# Patient Record
Sex: MALE | Race: BLACK OR AFRICAN AMERICAN | Employment: FULL TIME | URBAN - METROPOLITAN AREA
[De-identification: names, ages, dates, MRNs, and addresses within clinical notes are randomized per-mention and may not be internally consistent; named-entity substitution may affect disease eponyms.]

---

## 2017-11-07 ENCOUNTER — HOSPITAL ENCOUNTER (OUTPATIENT)
Age: 52
Setting detail: OBSERVATION
Discharge: HOME | End: 2017-11-08
Attending: EMERGENCY MEDICINE | Admitting: HOSPITALIST
Payer: COMMERCIAL

## 2017-11-07 ENCOUNTER — APPOINTMENT (OUTPATIENT)
Dept: GENERAL RADIOLOGY | Age: 52
End: 2017-11-07
Attending: NURSE PRACTITIONER
Payer: COMMERCIAL

## 2017-11-07 DIAGNOSIS — E86.0 DEHYDRATION: ICD-10-CM

## 2017-11-07 DIAGNOSIS — R42 DIZZINESS: ICD-10-CM

## 2017-11-07 DIAGNOSIS — R73.9 HYPERGLYCEMIA: Primary | ICD-10-CM

## 2017-11-07 PROBLEM — E11.00 TYPE 2 DIABETES MELLITUS WITH HYPEROSMOLARITY WITHOUT NONKETOTIC HYPERGLYCEMIC-HYPEROSMOLAR COMA (NKHHC) (HCC): Status: ACTIVE | Noted: 2017-11-07

## 2017-11-07 PROBLEM — N17.9 AKI (ACUTE KIDNEY INJURY) (HCC): Status: ACTIVE | Noted: 2017-11-07

## 2017-11-07 LAB
ADMINISTERED INITIALS, ADMINIT: NORMAL
ALBUMIN SERPL-MCNC: 3 G/DL (ref 3.4–5)
ALBUMIN SERPL-MCNC: 3.2 G/DL (ref 3.4–5)
ALBUMIN SERPL-MCNC: 3.3 G/DL (ref 3.4–5)
ALBUMIN/GLOB SERPL: 0.8 {RATIO} (ref 0.8–1.7)
ALBUMIN/GLOB SERPL: 0.9 {RATIO} (ref 0.8–1.7)
ALBUMIN/GLOB SERPL: 0.9 {RATIO} (ref 0.8–1.7)
ALP SERPL-CCNC: 123 U/L (ref 45–117)
ALP SERPL-CCNC: 84 U/L (ref 45–117)
ALP SERPL-CCNC: 99 U/L (ref 45–117)
ALT SERPL-CCNC: 37 U/L (ref 16–61)
ALT SERPL-CCNC: 37 U/L (ref 16–61)
ALT SERPL-CCNC: 40 U/L (ref 16–61)
ANION GAP SERPL CALC-SCNC: 10 MMOL/L (ref 3–18)
ANION GAP SERPL CALC-SCNC: 6 MMOL/L (ref 3–18)
ANION GAP SERPL CALC-SCNC: 9 MMOL/L (ref 3–18)
APPEARANCE UR: CLEAR
ARTERIAL PATENCY WRIST A: YES
AST SERPL-CCNC: 19 U/L (ref 15–37)
AST SERPL-CCNC: 19 U/L (ref 15–37)
AST SERPL-CCNC: 20 U/L (ref 15–37)
ATRIAL RATE: 72 BPM
BASE EXCESS BLD CALC-SCNC: 0 MMOL/L
BASOPHILS # BLD: 0 K/UL (ref 0–0.06)
BASOPHILS NFR BLD: 0 % (ref 0–2)
BDY SITE: ABNORMAL
BILIRUB SERPL-MCNC: 0.4 MG/DL (ref 0.2–1)
BILIRUB SERPL-MCNC: 0.4 MG/DL (ref 0.2–1)
BILIRUB SERPL-MCNC: 0.5 MG/DL (ref 0.2–1)
BILIRUB UR QL: NEGATIVE
BUN SERPL-MCNC: 30 MG/DL (ref 7–18)
BUN SERPL-MCNC: 30 MG/DL (ref 7–18)
BUN SERPL-MCNC: 32 MG/DL (ref 7–18)
BUN/CREAT SERPL: 19 (ref 12–20)
BUN/CREAT SERPL: 20 (ref 12–20)
BUN/CREAT SERPL: 23 (ref 12–20)
CALCIUM SERPL-MCNC: 8.8 MG/DL (ref 8.5–10.1)
CALCIUM SERPL-MCNC: 8.9 MG/DL (ref 8.5–10.1)
CALCIUM SERPL-MCNC: 9.1 MG/DL (ref 8.5–10.1)
CALCULATED P AXIS, ECG09: 43 DEGREES
CALCULATED R AXIS, ECG10: 6 DEGREES
CALCULATED T AXIS, ECG11: 36 DEGREES
CHLORIDE SERPL-SCNC: 107 MMOL/L (ref 100–108)
CHLORIDE SERPL-SCNC: 109 MMOL/L (ref 100–108)
CHLORIDE SERPL-SCNC: 96 MMOL/L (ref 100–108)
CK MB CFR SERPL CALC: 1.4 % (ref 0–4)
CK MB SERPL-MCNC: 4 NG/ML (ref 5–25)
CK SERPL-CCNC: 295 U/L (ref 39–308)
CO2 SERPL-SCNC: 30 MMOL/L (ref 21–32)
CO2 SERPL-SCNC: 31 MMOL/L (ref 21–32)
CO2 SERPL-SCNC: 33 MMOL/L (ref 21–32)
COLOR UR: YELLOW
CREAT SERPL-MCNC: 1.32 MG/DL (ref 0.6–1.3)
CREAT SERPL-MCNC: 1.5 MG/DL (ref 0.6–1.3)
CREAT SERPL-MCNC: 1.67 MG/DL (ref 0.6–1.3)
D50 ADMINISTERED, D50ADM: 0 ML
D50 ORDER, D50ORD: 0 ML
DIAGNOSIS, 93000: NORMAL
DIFFERENTIAL METHOD BLD: ABNORMAL
EOSINOPHIL # BLD: 0 K/UL (ref 0–0.4)
EOSINOPHIL NFR BLD: 0 % (ref 0–5)
ERYTHROCYTE [DISTWIDTH] IN BLOOD BY AUTOMATED COUNT: 13.8 % (ref 11.6–14.5)
EST. AVERAGE GLUCOSE BLD GHB EST-MCNC: 278 MG/DL
GAS FLOW.O2 O2 DELIVERY SYS: ABNORMAL L/MIN
GLOBULIN SER CALC-MCNC: 3.6 G/DL (ref 2–4)
GLOBULIN SER CALC-MCNC: 3.6 G/DL (ref 2–4)
GLOBULIN SER CALC-MCNC: 3.7 G/DL (ref 2–4)
GLUCOSE BLD STRIP.AUTO-MCNC: 166 MG/DL (ref 70–110)
GLUCOSE BLD STRIP.AUTO-MCNC: 177 MG/DL (ref 70–110)
GLUCOSE BLD STRIP.AUTO-MCNC: 203 MG/DL (ref 70–110)
GLUCOSE BLD STRIP.AUTO-MCNC: 225 MG/DL (ref 70–110)
GLUCOSE BLD STRIP.AUTO-MCNC: 260 MG/DL (ref 70–110)
GLUCOSE BLD STRIP.AUTO-MCNC: 289 MG/DL (ref 70–110)
GLUCOSE BLD STRIP.AUTO-MCNC: 423 MG/DL (ref 70–110)
GLUCOSE BLD STRIP.AUTO-MCNC: 441 MG/DL (ref 70–110)
GLUCOSE BLD STRIP.AUTO-MCNC: >600 MG/DL (ref 70–110)
GLUCOSE BLD STRIP.AUTO-MCNC: >600 MG/DL (ref 70–110)
GLUCOSE SERPL-MCNC: 184 MG/DL (ref 74–99)
GLUCOSE SERPL-MCNC: 370 MG/DL (ref 74–99)
GLUCOSE SERPL-MCNC: 798 MG/DL (ref 74–99)
GLUCOSE UR STRIP.AUTO-MCNC: >1000 MG/DL
GLUCOSE, GLC: 166 MG/DL
GLUCOSE, GLC: 203 MG/DL
GLUCOSE, GLC: 225 MG/DL
GLUCOSE, GLC: 260 MG/DL
GLUCOSE, GLC: 289 MG/DL
GLUCOSE, GLC: 423 MG/DL
GLUCOSE, GLC: 441 MG/DL
GLUCOSE, GLC: 600 MG/DL
HBA1C MFR BLD: 11.3 % (ref 4.5–5.6)
HCO3 BLD-SCNC: 26.1 MMOL/L (ref 22–26)
HCT VFR BLD AUTO: 43.5 % (ref 36–48)
HGB BLD-MCNC: 14.6 G/DL (ref 13–16)
HGB UR QL STRIP: NEGATIVE
HIGH TARGET, HITG: 180 MG/DL
INSULIN ADMINSTERED, INSADM: 1.4 UNITS/HOUR
INSULIN ADMINSTERED, INSADM: 10.8 UNITS/HOUR
INSULIN ADMINSTERED, INSADM: 2.1 UNITS/HOUR
INSULIN ADMINSTERED, INSADM: 2.3 UNITS/HOUR
INSULIN ADMINSTERED, INSADM: 3.3 UNITS/HOUR
INSULIN ADMINSTERED, INSADM: 3.8 UNITS/HOUR
INSULIN ADMINSTERED, INSADM: 6 UNITS/HOUR
INSULIN ADMINSTERED, INSADM: 7.3 UNITS/HOUR
INSULIN ORDER, INSORD: 1.4 UNITS/HOUR
INSULIN ORDER, INSORD: 10.8 UNITS/HOUR
INSULIN ORDER, INSORD: 2.1 UNITS/HOUR
INSULIN ORDER, INSORD: 2.3 UNITS/HOUR
INSULIN ORDER, INSORD: 3.3 UNITS/HOUR
INSULIN ORDER, INSORD: 3.8 UNITS/HOUR
INSULIN ORDER, INSORD: 6 UNITS/HOUR
INSULIN ORDER, INSORD: 7.3 UNITS/HOUR
KETONES UR QL STRIP.AUTO: 15 MG/DL
LEUKOCYTE ESTERASE UR QL STRIP.AUTO: NEGATIVE
LIPASE SERPL-CCNC: 191 U/L (ref 73–393)
LOW TARGET, LOT: 140 MG/DL
LYMPHOCYTES # BLD: 1.3 K/UL (ref 0.9–3.6)
LYMPHOCYTES NFR BLD: 10 % (ref 21–52)
MAGNESIUM SERPL-MCNC: 2.5 MG/DL (ref 1.6–2.6)
MCH RBC QN AUTO: 26.1 PG (ref 24–34)
MCHC RBC AUTO-ENTMCNC: 33.6 G/DL (ref 31–37)
MCV RBC AUTO: 77.8 FL (ref 74–97)
MINUTES UNTIL NEXT BG, NBG: 60 MIN
MONOCYTES # BLD: 0.7 K/UL (ref 0.05–1.2)
MONOCYTES NFR BLD: 6 % (ref 3–10)
MULTIPLIER, MUL: 0.01
MULTIPLIER, MUL: 0.02
MULTIPLIER, MUL: 0.03
NEUTS SEG # BLD: 11 K/UL (ref 1.8–8)
NEUTS SEG NFR BLD: 84 % (ref 40–73)
NITRITE UR QL STRIP.AUTO: NEGATIVE
O2/TOTAL GAS SETTING VFR VENT: 21 %
ORDER INITIALS, ORDINIT: NORMAL
OSMOLALITY SERPL: 345 MOSM/KG H2O (ref 275–295)
P-R INTERVAL, ECG05: 152 MS
PCO2 BLD: 47.2 MMHG (ref 35–45)
PH BLD: 7.35 [PH] (ref 7.35–7.45)
PH UR STRIP: 5 [PH] (ref 5–8)
PLATELET # BLD AUTO: 251 K/UL (ref 135–420)
PMV BLD AUTO: 10.9 FL (ref 9.2–11.8)
PO2 BLD: 78 MMHG (ref 80–100)
POTASSIUM SERPL-SCNC: 3.8 MMOL/L (ref 3.5–5.5)
POTASSIUM SERPL-SCNC: 4.1 MMOL/L (ref 3.5–5.5)
POTASSIUM SERPL-SCNC: 4.6 MMOL/L (ref 3.5–5.5)
PROT SERPL-MCNC: 6.6 G/DL (ref 6.4–8.2)
PROT SERPL-MCNC: 6.8 G/DL (ref 6.4–8.2)
PROT SERPL-MCNC: 7 G/DL (ref 6.4–8.2)
PROT UR STRIP-MCNC: NEGATIVE MG/DL
Q-T INTERVAL, ECG07: 408 MS
QRS DURATION, ECG06: 116 MS
QTC CALCULATION (BEZET), ECG08: 446 MS
RBC # BLD AUTO: 5.59 M/UL (ref 4.7–5.5)
SAO2 % BLD: 95 % (ref 92–97)
SERVICE CMNT-IMP: ABNORMAL
SODIUM SERPL-SCNC: 135 MMOL/L (ref 136–145)
SODIUM SERPL-SCNC: 146 MMOL/L (ref 136–145)
SODIUM SERPL-SCNC: 150 MMOL/L (ref 136–145)
SP GR UR REFRACTOMETRY: >1.03 (ref 1–1.03)
SPECIMEN TYPE: ABNORMAL
TROPONIN I SERPL-MCNC: <0.02 NG/ML (ref 0–0.06)
UROBILINOGEN UR QL STRIP.AUTO: 0.2 EU/DL (ref 0.2–1)
VENTRICULAR RATE, ECG03: 72 BPM
WBC # BLD AUTO: 13.1 K/UL (ref 4.6–13.2)

## 2017-11-07 PROCEDURE — 83690 ASSAY OF LIPASE: CPT | Performed by: NURSE PRACTITIONER

## 2017-11-07 PROCEDURE — 74011250636 HC RX REV CODE- 250/636: Performed by: HOSPITALIST

## 2017-11-07 PROCEDURE — 81003 URINALYSIS AUTO W/O SCOPE: CPT | Performed by: NURSE PRACTITIONER

## 2017-11-07 PROCEDURE — 96372 THER/PROPH/DIAG INJ SC/IM: CPT

## 2017-11-07 PROCEDURE — 65610000006 HC RM INTENSIVE CARE

## 2017-11-07 PROCEDURE — 82803 BLOOD GASES ANY COMBINATION: CPT

## 2017-11-07 PROCEDURE — 82550 ASSAY OF CK (CPK): CPT | Performed by: NURSE PRACTITIONER

## 2017-11-07 PROCEDURE — 74011250636 HC RX REV CODE- 250/636: Performed by: NURSE PRACTITIONER

## 2017-11-07 PROCEDURE — 85025 COMPLETE CBC W/AUTO DIFF WBC: CPT | Performed by: NURSE PRACTITIONER

## 2017-11-07 PROCEDURE — 99285 EMERGENCY DEPT VISIT HI MDM: CPT

## 2017-11-07 PROCEDURE — 74011000258 HC RX REV CODE- 258: Performed by: HOSPITALIST

## 2017-11-07 PROCEDURE — 74011000258 HC RX REV CODE- 258: Performed by: NURSE PRACTITIONER

## 2017-11-07 PROCEDURE — 93005 ELECTROCARDIOGRAM TRACING: CPT

## 2017-11-07 PROCEDURE — 83036 HEMOGLOBIN GLYCOSYLATED A1C: CPT | Performed by: NURSE PRACTITIONER

## 2017-11-07 PROCEDURE — 99218 HC RM OBSERVATION: CPT

## 2017-11-07 PROCEDURE — 82962 GLUCOSE BLOOD TEST: CPT

## 2017-11-07 PROCEDURE — 96366 THER/PROPH/DIAG IV INF ADDON: CPT

## 2017-11-07 PROCEDURE — 74011636637 HC RX REV CODE- 636/637: Performed by: HOSPITALIST

## 2017-11-07 PROCEDURE — 80053 COMPREHEN METABOLIC PANEL: CPT | Performed by: HOSPITALIST

## 2017-11-07 PROCEDURE — 80053 COMPREHEN METABOLIC PANEL: CPT | Performed by: NURSE PRACTITIONER

## 2017-11-07 PROCEDURE — 94762 N-INVAS EAR/PLS OXIMTRY CONT: CPT

## 2017-11-07 PROCEDURE — 96361 HYDRATE IV INFUSION ADD-ON: CPT

## 2017-11-07 PROCEDURE — 83735 ASSAY OF MAGNESIUM: CPT | Performed by: NURSE PRACTITIONER

## 2017-11-07 PROCEDURE — 36600 WITHDRAWAL OF ARTERIAL BLOOD: CPT

## 2017-11-07 PROCEDURE — 71010 XR CHEST PORT: CPT

## 2017-11-07 PROCEDURE — 36415 COLL VENOUS BLD VENIPUNCTURE: CPT | Performed by: HOSPITALIST

## 2017-11-07 PROCEDURE — 83930 ASSAY OF BLOOD OSMOLALITY: CPT | Performed by: HOSPITALIST

## 2017-11-07 PROCEDURE — 74011636637 HC RX REV CODE- 636/637

## 2017-11-07 PROCEDURE — 74011636637 HC RX REV CODE- 636/637: Performed by: NURSE PRACTITIONER

## 2017-11-07 PROCEDURE — 96365 THER/PROPH/DIAG IV INF INIT: CPT

## 2017-11-07 RX ORDER — DEXTROSE 50 % IN WATER (D50W) INTRAVENOUS SYRINGE
25-50 AS NEEDED
Status: DISCONTINUED | OUTPATIENT
Start: 2017-11-07 | End: 2017-11-07 | Stop reason: SDUPTHER

## 2017-11-07 RX ORDER — SODIUM CHLORIDE 9 MG/ML
1000 INJECTION, SOLUTION INTRAVENOUS CONTINUOUS
Status: DISCONTINUED | OUTPATIENT
Start: 2017-11-07 | End: 2017-11-07

## 2017-11-07 RX ORDER — INSULIN GLARGINE 100 [IU]/ML
30 INJECTION, SOLUTION SUBCUTANEOUS
Status: DISCONTINUED | OUTPATIENT
Start: 2017-11-07 | End: 2017-11-08

## 2017-11-07 RX ORDER — DEXTROSE, SODIUM CHLORIDE, AND POTASSIUM CHLORIDE 5; .45; .15 G/100ML; G/100ML; G/100ML
125 INJECTION INTRAVENOUS CONTINUOUS
Status: DISCONTINUED | OUTPATIENT
Start: 2017-11-07 | End: 2017-11-07

## 2017-11-07 RX ORDER — PANTOPRAZOLE SODIUM 40 MG/1
40 TABLET, DELAYED RELEASE ORAL DAILY
Status: DISCONTINUED | OUTPATIENT
Start: 2017-11-08 | End: 2017-11-08 | Stop reason: HOSPADM

## 2017-11-07 RX ORDER — MAGNESIUM SULFATE 100 %
16 CRYSTALS MISCELLANEOUS AS NEEDED
Status: DISCONTINUED | OUTPATIENT
Start: 2017-11-07 | End: 2017-11-08 | Stop reason: HOSPADM

## 2017-11-07 RX ORDER — ENOXAPARIN SODIUM 100 MG/ML
40 INJECTION SUBCUTANEOUS EVERY 24 HOURS
Status: DISCONTINUED | OUTPATIENT
Start: 2017-11-07 | End: 2017-11-08 | Stop reason: HOSPADM

## 2017-11-07 RX ORDER — INSULIN LISPRO 100 [IU]/ML
INJECTION, SOLUTION INTRAVENOUS; SUBCUTANEOUS
Status: DISCONTINUED | OUTPATIENT
Start: 2017-11-07 | End: 2017-11-08 | Stop reason: HOSPADM

## 2017-11-07 RX ORDER — DEXTROSE 50 % IN WATER (D50W) INTRAVENOUS SYRINGE
50 AS NEEDED
Status: DISCONTINUED | OUTPATIENT
Start: 2017-11-07 | End: 2017-11-08 | Stop reason: HOSPADM

## 2017-11-07 RX ORDER — SODIUM CHLORIDE 9 MG/ML
150 INJECTION, SOLUTION INTRAVENOUS CONTINUOUS
Status: DISCONTINUED | OUTPATIENT
Start: 2017-11-07 | End: 2017-11-07

## 2017-11-07 RX ORDER — MAGNESIUM SULFATE 100 %
4 CRYSTALS MISCELLANEOUS AS NEEDED
Status: DISCONTINUED | OUTPATIENT
Start: 2017-11-07 | End: 2017-11-07 | Stop reason: SDUPTHER

## 2017-11-07 RX ORDER — SODIUM CHLORIDE 450 MG/100ML
125 INJECTION, SOLUTION INTRAVENOUS CONTINUOUS
Status: DISCONTINUED | OUTPATIENT
Start: 2017-11-07 | End: 2017-11-08 | Stop reason: HOSPADM

## 2017-11-07 RX ORDER — DEXTROSE 50 % IN WATER (D50W) INTRAVENOUS SYRINGE
25-50 AS NEEDED
Status: DISCONTINUED | OUTPATIENT
Start: 2017-11-07 | End: 2017-11-08 | Stop reason: HOSPADM

## 2017-11-07 RX ORDER — MAGNESIUM SULFATE 100 %
4 CRYSTALS MISCELLANEOUS AS NEEDED
Status: DISCONTINUED | OUTPATIENT
Start: 2017-11-07 | End: 2017-11-08 | Stop reason: HOSPADM

## 2017-11-07 RX ORDER — INSULIN LISPRO 100 [IU]/ML
5 INJECTION, SOLUTION INTRAVENOUS; SUBCUTANEOUS
Status: DISCONTINUED | OUTPATIENT
Start: 2017-11-08 | End: 2017-11-08 | Stop reason: HOSPADM

## 2017-11-07 RX ADMIN — SODIUM CHLORIDE 1000 ML: 900 INJECTION, SOLUTION INTRAVENOUS at 09:39

## 2017-11-07 RX ADMIN — DEXTROSE MONOHYDRATE, SODIUM CHLORIDE, AND POTASSIUM CHLORIDE 125 ML/HR: 50; 4.5; 1.49 INJECTION, SOLUTION INTRAVENOUS at 16:40

## 2017-11-07 RX ADMIN — SODIUM CHLORIDE 1000 ML: 900 INJECTION, SOLUTION INTRAVENOUS at 10:32

## 2017-11-07 RX ADMIN — SODIUM CHLORIDE 125 ML/HR: 450 INJECTION, SOLUTION INTRAVENOUS at 18:16

## 2017-11-07 RX ADMIN — SODIUM CHLORIDE 10.8 UNITS/HR: 900 INJECTION, SOLUTION INTRAVENOUS at 10:53

## 2017-11-07 RX ADMIN — SODIUM CHLORIDE 2.3 UNITS/HR: 900 INJECTION, SOLUTION INTRAVENOUS at 14:21

## 2017-11-07 RX ADMIN — ENOXAPARIN SODIUM 40 MG: 40 INJECTION SUBCUTANEOUS at 16:41

## 2017-11-07 RX ADMIN — SODIUM CHLORIDE 150 ML/HR: 900 INJECTION, SOLUTION INTRAVENOUS at 13:24

## 2017-11-07 RX ADMIN — INSULIN LISPRO 2 UNITS: 100 INJECTION, SOLUTION INTRAVENOUS; SUBCUTANEOUS at 22:16

## 2017-11-07 RX ADMIN — INSULIN GLARGINE 30 UNITS: 100 INJECTION, SOLUTION SUBCUTANEOUS at 18:16

## 2017-11-07 RX ADMIN — INSULIN HUMAN 17 UNITS: 100 INJECTION, SOLUTION PARENTERAL at 10:38

## 2017-11-07 NOTE — ED PROVIDER NOTES
[address]  EMERGENCY DEPARTMENT HISTORY AND PHYSICAL EXAM       Date: 11/7/2017   Patient Name: Sosa Chawla   YOB: 1965  Medical Record Number: [id number]    History of Presenting Illness       Chief Complaint   Patient presents with    Fatigue        History Provided By:  patient    Additional History: 9:19 AM  Sosa Chawla is a 49 y.o. male who presents to the emergency department C/O lightheadedness onset 8 days ago. Associated symptoms include polyuria, polydipsia, decreased appetite, urinary frequency, and fatigue. No known hx of DM in himself, but has fhx of DMII. Does not generally check his blood sugar. FSBS in ED reading over 600 mg/dL. Pmhx of sickle cell trait. Pt denies hx DM, N/V, difficulty urinating, CP, SOB, abdominal pain, wheezing, fever, chills, shakiness, and any other Sx or complaints. Primary Care Provider: Kalie Gleason MD   Specialist:      Past History     Past Medical History:   Past Medical History:   Diagnosis Date    Hypertension         Past Surgical History:   Past Surgical History:   Procedure Laterality Date    HX KNEE ARTHROSCOPY      rt knee    HX SEPTOPLASTY          Family History:   History reviewed. No pertinent family history. Social History:   Social History   Substance Use Topics    Smoking status: Former Smoker    Smokeless tobacco: Never Used    Alcohol use Yes      Comment: occ        Allergies:   No Known Allergies     Review of Systems   Review of Systems   Constitutional: Positive for appetite change and fatigue. Negative for chills and fever. Respiratory: Negative for shortness of breath and wheezing. Cardiovascular: Negative for chest pain. Gastrointestinal: Negative for abdominal pain, nausea and vomiting. Endocrine: Positive for polydipsia and polyuria. Genitourinary: Positive for frequency. Negative for difficulty urinating. Neurological: Positive for light-headedness.  Negative for tremors and seizures. All other systems reviewed and are negative. Physical Exam  Vitals:    11/07/17 1030 11/07/17 1100 11/07/17 1130 11/07/17 1200   BP: 141/71 148/86 130/79 140/79   Pulse: 75 86 76 74   Resp: 16 15 13 14   Temp:       SpO2: 99% 99% 96% 96%   Weight:       Height:           Physical Exam   Constitutional: He is oriented to person, place, and time. He appears well-developed. HENT:   Head: Normocephalic and atraumatic. Mouth/Throat: Mucous membranes are dry. Eyes: Conjunctivae and EOM are normal. Pupils are equal, round, and reactive to light. Neck: Normal range of motion. Cardiovascular: Normal rate and regular rhythm. Pulmonary/Chest: Effort normal and breath sounds normal.   Abdominal: Soft. Bowel sounds are normal. There is no tenderness. Musculoskeletal: Normal range of motion. Neurological: He is alert and oriented to person, place, and time. Skin: Skin is warm and dry. Nursing note and vitals reviewed. Impression: Concern for DKA or HHS. Will check basic labs to evaluate for severe dehydration and electrolyte abnormality. Patient sugar is currently over 600. Patient given NSS will start insulin once BS is resulted. Check urinalysis, EKG, ABG.  Patient is agreeable to being admitted as he has no history of diabetes     Diagnostic Study Results     Labs -      Recent Results (from the past 12 hour(s))   GLUCOSE, POC    Collection Time: 11/07/17  9:20 AM   Result Value Ref Range    Glucose (POC) >600 (HH) 70 - 110 mg/dL   CBC WITH AUTOMATED DIFF    Collection Time: 11/07/17  9:35 AM   Result Value Ref Range    WBC 13.1 4.6 - 13.2 K/uL    RBC 5.59 (H) 4.70 - 5.50 M/uL    HGB 14.6 13.0 - 16.0 g/dL    HCT 43.5 36.0 - 48.0 %    MCV 77.8 74.0 - 97.0 FL    MCH 26.1 24.0 - 34.0 PG    MCHC 33.6 31.0 - 37.0 g/dL    RDW 13.8 11.6 - 14.5 %    PLATELET 369 141 - 870 K/uL    MPV 10.9 9.2 - 11.8 FL    NEUTROPHILS 84 (H) 40 - 73 %    LYMPHOCYTES 10 (L) 21 - 52 %    MONOCYTES 6 3 - 10 % EOSINOPHILS 0 0 - 5 %    BASOPHILS 0 0 - 2 %    ABS. NEUTROPHILS 11.0 (H) 1.8 - 8.0 K/UL    ABS. LYMPHOCYTES 1.3 0.9 - 3.6 K/UL    ABS. MONOCYTES 0.7 0.05 - 1.2 K/UL    ABS. EOSINOPHILS 0.0 0.0 - 0.4 K/UL    ABS. BASOPHILS 0.0 0.0 - 0.06 K/UL    DF AUTOMATED     METABOLIC PANEL, COMPREHENSIVE    Collection Time: 11/07/17  9:35 AM   Result Value Ref Range    Sodium 135 (L) 136 - 145 mmol/L    Potassium 4.6 3.5 - 5.5 mmol/L    Chloride 96 (L) 100 - 108 mmol/L    CO2 30 21 - 32 mmol/L    Anion gap 9 3.0 - 18 mmol/L    Glucose 798 (HH) 74 - 99 mg/dL    BUN 32 (H) 7.0 - 18 MG/DL    Creatinine 1.67 (H) 0.6 - 1.3 MG/DL    BUN/Creatinine ratio 19 12 - 20      GFR est AA 53 (L) >60 ml/min/1.73m2    GFR est non-AA 44 (L) >60 ml/min/1.73m2    Calcium 9.1 8.5 - 10.1 MG/DL    Bilirubin, total 0.5 0.2 - 1.0 MG/DL    ALT (SGPT) 40 16 - 61 U/L    AST (SGOT) 20 15 - 37 U/L    Alk.  phosphatase 123 (H) 45 - 117 U/L    Protein, total 7.0 6.4 - 8.2 g/dL    Albumin 3.3 (L) 3.4 - 5.0 g/dL    Globulin 3.7 2.0 - 4.0 g/dL    A-G Ratio 0.9 0.8 - 1.7     MAGNESIUM    Collection Time: 11/07/17  9:35 AM   Result Value Ref Range    Magnesium 2.5 1.6 - 2.6 mg/dL   LIPASE    Collection Time: 11/07/17  9:35 AM   Result Value Ref Range    Lipase 191 73 - 393 U/L   CARDIAC PANEL,(CK, CKMB & TROPONIN)    Collection Time: 11/07/17  9:35 AM   Result Value Ref Range     39 - 308 U/L    CK - MB 4.0 (H) <3.6 ng/ml    CK-MB Index 1.4 0.0 - 4.0 %    Troponin-I, Qt. <0.02 0.00 - 0.06 NG/ML   OSMOLALITY, SERUM/PLASMA    Collection Time: 11/07/17  9:35 AM   Result Value Ref Range    Osmolality, serum/plasma 345 (H) 275 - 295 MOSM/kg H2O   EKG, 12 LEAD, INITIAL    Collection Time: 11/07/17  9:49 AM   Result Value Ref Range    Ventricular Rate 72 BPM    Atrial Rate 72 BPM    P-R Interval 152 ms    QRS Duration 116 ms    Q-T Interval 408 ms    QTC Calculation (Bezet) 446 ms    Calculated P Axis 43 degrees    Calculated R Axis 6 degrees    Calculated T Axis 36 degrees    Diagnosis       Normal sinus rhythm  Nonspecific T wave abnormality  Abnormal ECG  No previous ECGs available     POC G3    Collection Time: 11/07/17 10:05 AM   Result Value Ref Range    Device: ROOM AIR      FIO2 (POC) 21 %    pH (POC) 7.350 7.35 - 7.45      pCO2 (POC) 47.2 (H) 35.0 - 45.0 MMHG    pO2 (POC) 78 (L) 80 - 100 MMHG    HCO3 (POC) 26.1 (H) 22 - 26 MMOL/L    sO2 (POC) 95 92 - 97 %    Base excess (POC) 0 mmol/L    Allens test (POC) YES      Site RIGHT RADIAL      Specimen type (POC) ARTERIAL      Performed by Connor Zheng    GLUCOSE, POC    Collection Time: 11/07/17 10:50 AM   Result Value Ref Range    Glucose (POC) >600 (HH) 70 - 110 mg/dL   GLUCOSTABILIZER    Collection Time: 11/07/17 10:52 AM   Result Value Ref Range    Glucose 600 mg/dL    Insulin order 10.8 units/hour    Insulin adminstered 10.8 units/hour    Multiplier 0.020     Low target 140 mg/dL    High target 180 mg/dL    D50 order 0.0 ml    D50 administered 0.00 ml    Minutes until next BG 60 min    Order initials kpc     Administered initials kp    URINALYSIS W/ RFLX MICROSCOPIC    Collection Time: 11/07/17 10:55 AM   Result Value Ref Range    Color YELLOW      Appearance CLEAR      Specific gravity >1.030 (H) 1.005 - 1.030    pH (UA) 5.0 5.0 - 8.0      Protein NEGATIVE  NEG mg/dL    Glucose >1000 (A) NEG mg/dL    Ketone 15 (A) NEG mg/dL    Bilirubin NEGATIVE  NEG      Blood NEGATIVE  NEG      Urobilinogen 0.2 0.2 - 1.0 EU/dL    Nitrites NEGATIVE  NEG      Leukocyte Esterase NEGATIVE  NEG     GLUCOSE, POC    Collection Time: 11/07/17 11:51 AM   Result Value Ref Range    Glucose (POC) 441 (HH) 70 - 110 mg/dL   GLUCOSTABILIZER    Collection Time: 11/07/17 11:53 AM   Result Value Ref Range    Glucose 441 mg/dL    Insulin order 3.8 units/hour    Insulin adminstered 3.8 units/hour    Multiplier 0.010     Low target 140 mg/dL    High target 180 mg/dL    D50 order 0.0 ml    D50 administered 0.00 ml    Minutes until next BG 60 min    Order initials [facility]     Administered initials [facility]        Radiologic Studies -  The following have been ordered and reviewed:   XR CHEST PORT   Final Result   Impression:  Mildly rotated projection of the chest, without superimposed acute radiographic abnormality. As read by the radiologist.      [facility]  Chest X-ray shows no acute cardiopulmonary process   Pending review by Radiologist  Recorded by Rosana Aguirre ED Scribe, as dictated by Micky PONCE       Medical Decision Making   I am the first provider for this patient. I reviewed the vital signs, available nursing notes, past medical history, past surgical history, family history and social history. Vital Signs-Reviewed the patient's vital signs. Patient Vitals for the past 12 hrs:   Temp Pulse Resp BP SpO2   11/07/17 1200 - 74 14 140/79 96 %   11/07/17 1130 - 76 13 130/79 96 %   11/07/17 1100 - 86 15 148/86 99 %   11/07/17 1030 - 75 16 141/71 99 %   11/07/17 1000 - 77 14 146/85 -   11/07/17 0930 - 79 13 136/67 97 %   11/07/17 0929 97.7 °F (36.5 °C) 79 16 138/71 96 %       Pulse Oximetry Analysis - Normal 97% on RA     Cardiac Monitor:   Rate: 72 bpm  Rhythm: Normal Sinus Rhythm       EKG interpretation: (Preliminary)  Rhythm: NSR. Rate (approx.): 72 bpm. No STEMI. EKG read by Micky PONCE  at 9:49 AM     ED Course:  9:19 AM  Initial assessment performed. The patients presenting problems have been discussed, and they are in agreement with the care plan formulated and outlined with them. I have encouraged them to ask questions as they arise throughout their visit. 10:31 AM Pt updated on blood sugar level and need for admission. States that he is still feeling extremely thirsty. Currently getting IVF and insulin has been started.  Will page hospitalist.    10:39 AM Discussed patient's history, exam, and available diagnostics results with Debbie Garduno MD, internal medicine, who agrees to admit to ICU and would like normal saline at 150. Medications Given in the ED:  Medications   0.9% sodium chloride infusion 1,000 mL (1,000 mL IntraVENous New Bag 11/7/17 0939)   0.9% sodium chloride infusion 1,000 mL (1,000 mL IntraVENous New Bag 11/7/17 1032)   0.9% sodium chloride infusion (not administered)   insulin regular (NOVOLIN R, HUMULIN R) 100 Units in 0.9% sodium chloride 100 mL infusion (3.8 Units/hr IntraVENous Rate Change 11/7/17 1154)   glucose chewable tablet 16 g (not administered)   glucagon (GLUCAGEN) injection 1 mg (not administered)   dextrose (D50W) injection syrg 12.5-25 g (not administered)   insulin regular (NOVOLIN R, HUMULIN R) injection 17 Units (17 Units IntraVENous Given 11/7/17 1038)       Admission Note:  10:39 AM Patient is being admitted to the hospital by Umair Brower MD. The results of their tests and reasons for their admission have been discussed with them and/or available family. They convey agreement and understanding for the need to be admitted and for their admission diagnosis. Conditions on Admission:  Deep Vein Thrombosis is not present at the time of admission. Thrombosis is not present at the time of admission. Urinary Tract Infection is not present at the time of admission. Pneumonia is not present at the time of admission. MRSA is not present at the time of admission. Wound infection is not present at the time of admission. Pressure Ulcer is not present at the time of admission. Critical Care Time:  I have spent 35 minutes of critical care time involved in lab review, consultations with specialist, family decision-making, and documentation. During this entire length of time I was immediately available to the patient. Critical Care:   The reason for providing this level of medical care for this critically ill patient was due a critical illness that impaired one or more vital organ systems such that there was a high probability of imminent or life threatening deterioration in the patients condition. This care involved high complexity decision making to assess, manipulate, and support vital system functions, to treat this degreee vital organ system failure and to prevent further life threatening deterioration of the patients condition. Diagnosis   Clinical Impression:   1. Hyperglycemia    2. Dehydration    3. Dizziness         _______________________________   Attestations: This note is prepared by Dana Clifford, acting as a Scribe for [facility]. [facility]: The scribe's documentation has been prepared under my direction and personally reviewed by me in its entirety.  I confirm that the note above accurately reflects all work, treatment, procedures, and medical decision making performed by me.    _______________________________

## 2017-11-07 NOTE — H&P
History & Physical    Patient: Melina Kenyon MRN: [id number]  CSN: [id number]    YOB: 1965  Age: 49 y.o. Sex: male      DOA: 11/7/2017  Primary Care Provider:  Felisa Rivas MD      Assessment/Plan     Patient Active Problem List   Diagnosis Code    Dizziness R42    Hyperglycemia R73.9    Type 2 diabetes mellitus with hyperosmolarity without nonketotic hyperglycemic-hyperosmolar coma [facility]) ([facility] [address] E11.00    DONELL (acute kidney injury) ([facility] [address] N17.9     Admit to ICU  Continue IV insulin and fluids   Monitor BMP BID along with Mag and phos   Monitor urine output with goal of at least 50 to 80cc per hr   Once off insulin drip then will transition to sq insulin based on needs   Check hemoglobin A1C and fasting lipid panel to better risk stratify patient's cardiovascular status   Discussed recommendations for outpatient follow ups with opthalmology   Need to be on ACE inhibitor/ARB if not already on one. Will consult diabetic nurse educator. Will explain to the paitent about the impact of diabetes on kidney,heart,nervous system and eyes   DVT prophylaxis    Estimated length of stay : 1-2 days    CC: fatigue, polyuria, polydipsia. HPI:     Melina Kenyon is a 49 y.o. male who has past history of HTN presents to Er with concerns of fatigue, polyuria and polydipsia. This morning he also felt lightheaded. He has no history of DM. Denies any chest pain, SOB. He reports he drinks gatorade, orange juice and iced tea almost every day. Denies smoking, occasional alcohol use. In ER his blood sugars 798, creatine at 1.67. He is being admitted for further management. Past Medical History:   Diagnosis Date    Hypertension        Past Surgical History:   Procedure Laterality Date    HX KNEE ARTHROSCOPY      rt knee    HX SEPTOPLASTY         History reviewed. No pertinent family history.     Social History     Social History    Marital status: LEGALLY      Spouse name: N/A    Number of children: N/A    Years of education: N/A     Social History Main Topics    Smoking status: Former Smoker    Smokeless tobacco: Never Used    Alcohol use Yes      Comment: occ    Drug use: Yes     Special: Marijuana    Sexual activity: Not Asked     Other Topics Concern    None     Social History Narrative    None       Prior to Admission medications    Not on File       No Known Allergies    Review of Systems  Gen: No fever, chills, malaise, weight loss/gain. See above  Heent: No headache, rhinorrhea, epistaxis, ear pain, hearing loss, sinus pain, neck pain/stiffness, sore throat. Heart: No chest pain, palpitations, PATRICIA, pnd, or orthopnea. Resp: No cough, hemoptysis, wheezing and shortness of breath. GI: No nausea, vomiting, diarrhea, constipation, melena or hematochezia. : No urinary obstruction, dysuria or hematuria. Derm: No rash, new skin lesion or pruritis. Musc/skeletal: no bone or joint complains. Vasc: No edema, cyanosis or claudication. Endo:see above  Neuro: No unilateral weakness, numbness, tingling. No seizures. Heme: No easy bruising or bleeding. Physical Exam:     Physical Exam:  Visit Vitals    BP (!) 164/149    Pulse 75    Temp 97.8 °F (36.6 °C)    Resp 13    Ht 5' 10\" (1.778 m)    Wt 113.4 kg (250 lb)    SpO2 98%    BMI 35.87 kg/m2      O2 Device: Room air    Temp (24hrs), Av.8 °F (36.6 °C), Min:97.7 °F (36.5 °C), Max:97.8 °F (36.6 °C)     07 -  1900  In: 0   Out: 1000 [Urine:1000]        General:  Awake, cooperative, no distress. Head:  Normocephalic, without obvious abnormality, atraumatic. Eyes:  Conjunctivae/corneas clear, sclera anicteric, PERRL, EOMs intact. Nose: Nares normal. No drainage or sinus tenderness. Throat: Lips, mucosa, and tongue normal.    Neck: Supple, symmetrical, trachea midline, no adenopathy. Lungs:   Clear to auscultation bilaterally.        Heart:  Regular rate and rhythm, S1, S2 normal, no murmur, click, rub or gallop. Abdomen: Soft, non-tender. Bowel sounds normal. No masses,  No organomegaly. Extremities: Extremities normal, atraumatic, no cyanosis or edema. Capillary refill normal.   Pulses: 2+ and symmetric all extremities. Skin: Skin color pink, turgor normal. No rashes or lesions   Neurologic: CNII-XII intact. No focal motor or sensory deficit.        Labs Reviewed:    CMP:   Lab Results   Component Value Date/Time     (H) 11/07/2017 01:05 PM    K 3.8 11/07/2017 01:05 PM     11/07/2017 01:05 PM    CO2 33 (H) 11/07/2017 01:05 PM    AGAP 6 11/07/2017 01:05 PM     (H) 11/07/2017 01:05 PM    BUN 30 (H) 11/07/2017 01:05 PM    CREA 1.50 (H) 11/07/2017 01:05 PM    GFRAA >60 11/07/2017 01:05 PM    GFRNA 50 (L) 11/07/2017 01:05 PM    CA 8.8 11/07/2017 01:05 PM    MG 2.5 11/07/2017 09:35 AM    ALB 3.2 (L) 11/07/2017 01:05 PM    TP 6.8 11/07/2017 01:05 PM    GLOB 3.6 11/07/2017 01:05 PM    AGRAT 0.9 11/07/2017 01:05 PM    SGOT 19 11/07/2017 01:05 PM    ALT 37 11/07/2017 01:05 PM     CBC:   Lab Results   Component Value Date/Time    WBC 13.1 11/07/2017 09:35 AM    HGB 14.6 11/07/2017 09:35 AM    HCT 43.5 11/07/2017 09:35 AM     11/07/2017 09:35 AM     All Cardiac Markers in the last 24 hours:   Lab Results   Component Value Date/Time     11/07/2017 09:35 AM    CKMB 4.0 (H) 11/07/2017 09:35 AM    CKND1 1.4 11/07/2017 09:35 AM    TROIQ <0.02 11/07/2017 09:35 AM         Procedures/imaging: see electronic medical records for all procedures/Xrays and details which were not copied into this note but were reviewed prior to creation of Plan        CC: Mary Johnson MD

## 2017-11-07 NOTE — PROGRESS NOTES
conducted an initial consultation and Spiritual Assessment for Kelly Tuttle, who is a 49 y.o.,male. Patients Primary Language is: [place]. According to the patients EMR Oriental orthodox Affiliation is: Noreen Herrera.     The reason the Patient came to the hospital is:   Patient Active Problem List    Diagnosis Date Noted    Dizziness 11/07/2017    Hyperglycemia 11/07/2017    Type 2 diabetes mellitus with hyperosmolarity without nonketotic hyperglycemic-hyperosmolar coma [facility]) ([facility] [address] 11/07/2017    DONELL (acute kidney injury) ([facility] 11/07/2017        The  provided the following Interventions:  Initiated a relationship of care and support. Explored issues of berenice, belief, spirituality and Hinduism/ritual needs while hospitalized. Listened empathically. Provided chaplaincy education. Provided information about Spiritual Care Services. Offered assurance of prayer on patient's behalf. Chart reviewed. The following outcomes where achieved:  Patient shared limited information about both their medical narrative and spiritual journey/beliefs.  confirmed Patient's Oriental orthodox Affiliation. Patient processed feeling about current hospitalization. Patient expressed gratitude for 's visit. Assessment:  Patient is adjusting to the reality of his diagnosis. He has supportive family, and appreciates knowing  support is available.  provided brochure and spiritual Health kit for patient. Patient does not have any Hinduism/cultural needs that will affect patients preferences in health care. Plan:  Chaplains will continue to follow and will provide pastoral care on an as needed/requested basis.  recommends bedside caregivers page  on duty if patient shows signs of acute spiritual or emotional distress. Rev.  Snehal Cage  [address] [place] [address]  [phone]

## 2017-11-07 NOTE — PROGRESS NOTES
Readmission Risk Assessment: Low Risk and [facility]P/[facility] Help [facility] patients    RRAT Score: 1 - 12    Initial Assessment:Emergency Contact: chart reviewed pt admitted for hyperglycemia,requiring ICU level of care at this time,cm will cont to review chart and remain available for d/c planning if needed. Pertinent Medical Hx: see chart  PCP/Specialists: Community Services: Tanya Davis    DME:     Low Risk Care Transition Plan:  1. Evaluate for [facility] or [facility], community care coordination of resources  2. Involve patient/caregiver in assessment, planning, education and implement of intervention. 3. CM daily patient care huddles/interdisciplinary rounds. 4. PCP/Specialist appointment within 7 - 10 days made prior to discharge. 5. Facilitate transportation and logistics for follow-up appointments. 6. Handoff to [address] Nurse Navigator or PCP practice.

## 2017-11-07 NOTE — IP AVS SNAPSHOT
[facility] 
 
 
 [address] [facility] [address] 
[phone] Patient: Sally Matos MRN: [id number] :1965 About your hospitalization You were admitted on:  2017 You last received care in the:  [address] [place] [address] You were discharged on:  2017 Why you were hospitalized Your primary diagnosis was:  Dm (Diabetes Mellitus) (Hcc) Your diagnoses also included:  Dizziness, Hyperglycemia, Kali (Acute Kidney Injury) (Hcc) Things You Need To Do (next 8 weeks) Follow up with Monika Giron MD  
  
Phone:  [phone] Where:  [facility] 41 , [address] [facility]. [facility] 173 , [address] [facility]    , [facility] 36 [id number] Follow up with Note:  Dr. Michell Adkins retired as of 2017, therefore Dr. Aston Theodore will follow up with patient.  Follow up with Cheryl Caldwell MD  
Follow up appointment scheduled for 2017 at 3:40 p.m. with Dr. Aston Theodore. (first available appointment). Patient to contact MD's office in a couple of days to see if there is a cancellation so appointment may be made sooner than . Phone:  [phone] Where:  [address] [facility] [address], [address] [facility] Discharge Orders None A check renan indicates which time of day the medication should be taken. My Medications TAKE these medications as instructed Instructions Each Dose to Equal  
 Morning Noon Evening Bedtime  
 insulin glargine 100 unit/mL (3 mL) Inpn Commonly known asAlto Martinez Your last dose was: Your next dose is:    
   
   
 35 Units by SubCUTAneous route nightly. 35 Units  
    
   
   
   
  
 lisinopril 5 mg tablet Commonly known as:  Claudia Loss Your last dose was: Your next dose is: Take 1 Tab by mouth daily. 5 mg metFORMIN 500 mg tablet Commonly known as:  GLUCOPHAGE Your last dose was: Your next dose is: Take 1 Tab by mouth two (2) times daily (with meals). 500 mg Where to Get Your Medications Information on where to get these meds will be given to you by the nurse or doctor. ! Ask your nurse or doctor about these medications  
  insulin glargine 100 unit/mL (3 mL) Inpn  
 lisinopril 5 mg tablet  
 metFORMIN 500 mg tablet Discharge Instructions Lab Results Component Value Date/Time Hemoglobin A1c 11.3 11/07/2017 09:35 AM  
 
 
This lab test reflects that your blood sugar has been slightly elevated over the past 3 months and should be evaluated by your primary care provider. An A1C of 5.7-6.4% meets the criteria for pre-diabetes; an A1C of 6.5% or higher meets the criteria for diabetes. This lab test reflects that your blood sugar averaged 289 mg/dL  over the past 3 months. It is important to follow up with your provider on a routine basis to continue to evaluate your blood sugar and discuss any necessary changes in treatment. Patient armband removed and shredded [facility] Announcement We are excited to announce that we are making your provider's discharge notes available to you in [facility]. You will see these notes when they are completed and signed by the physician that discharged you from your recent hospital stay. If you have any questions or concerns about any information you see in [facility], please call the Health Information Department where you were seen or reach out to your Primary Care Provider for more information about your plan of care. Introducing [facility]! [facility] introduces [facility] patient portal. Now you can access parts of your medical record, email your doctor's office, and request medication refills online.    
 
1. In your internet browser, go to [email or web address] 2. Click on the First Time User? Click Here link in the Sign In box. You will see the New Member Sign Up page. 3. Enter your [employer] Access Code exactly as it appears below. You will not need to use this code after youve completed the sign-up process. If you do not sign up before the expiration date, you must request a new code. · [employer] Access Code: [email or web address] Expires: 2/6/2018  3:41 PM 
 
4. Enter the last four digits of your Social Security Number (xxxx) and Date of Birth (mm/dd/yyyy) as indicated and click Submit. You will be taken to the next sign-up page. 5. Create a [employer] ID. This will be your [employer] login ID and cannot be changed, so think of one that is secure and easy to remember. 6. Create a [employer] password. You can change your password at any time. 7. Enter your Password Reset Question and Answer. This can be used at a later time if you forget your password. 8. Enter your e-mail address. You will receive e-mail notification when new information is available in [address] 9. Click Sign Up. You can now view and download portions of your medical record. 10. Click the Download Summary menu link to download a portable copy of your medical information. If you have questions, please visit the Frequently Asked Questions section of the [employer] website. Remember, [employer] is NOT to be used for urgent needs. For medical emergencies, dial 911. Now available from your iPhone and Android! Providers Seen During Your Hospitalization Provider Specialty Primary office phone Theta MD [facility] [phone] Aditya Shelton MD [facility] [phone] Your Primary Care Physician (PCP) Primary Care Physician Office Phone Office Fax Sheran Jeans [phone] You are allergic to the following No active allergies Recent Documentation Height Weight BMI Smoking Status 1.778 m 113.4 kg 35.87 kg/m2 Former Smoker Emergency Contacts Name Discharge Info Relation Home Work Mobile Marjorie Lopez DISCHARGE CAREGIVER [3] Friend [5] [phone] Patient Belongings The following personal items are in your possession at time of discharge: 
     Visual Aid: None Please provide this summary of care documentation to your next provider. Signatures-by signing, you are acknowledging that this After Visit Summary has been reviewed with you and you have received a copy. Patient Signature:  ____________________________________________________________ Date:  ____________________________________________________________  
  
[facility] Provider Signature:  ____________________________________________________________ Date:  ____________________________________________________________

## 2017-11-07 NOTE — ED NOTES
TRANSFER - OUT REPORT:    Verbal report given to Lois Kim RN on Brendon Gagnon  being transferred to ICU bed 101 for routine progression of care       Report consisted of patients Situation, Background, Assessment and   Recommendations(SBAR). Information from the following report(s) SBAR, ED Summary, Intake/Output, MAR and Recent Results was reviewed with the receiving nurse. Lines:   Peripheral IV 11/07/17 Right Antecubital (Active)   Site Assessment Clean, dry, & intact 11/7/2017  9:35 AM   Phlebitis Assessment 0 11/7/2017  9:35 AM   Infiltration Assessment 0 11/7/2017  9:35 AM   Dressing Status Clean, dry, & intact 11/7/2017  9:35 AM   Dressing Type Transparent 11/7/2017  9:35 AM   Hub Color/Line Status Green 11/7/2017  9:35 AM        Opportunity for questions and clarification was provided.       Patient transported with:   Monitor  Registered Nurse  Tech

## 2017-11-07 NOTE — IP AVS SNAPSHOT
[address] [address] 
[phone] Patient: Seda Mondragon MRN: [id number] :1965 My Medications TAKE these medications as instructed Instructions Each Dose to Equal  
 Morning Noon Evening Bedtime  
 insulin glargine 100 unit/mL (3 mL) Inpn Commonly known asRosalita Stare Your last dose was: Your next dose is:    
   
   
 35 Units by SubCUTAneous route nightly. 35 Units  
    
   
   
   
  
 lisinopril 5 mg tablet Commonly known as:  Rayo Any Your last dose was: Your next dose is: Take 1 Tab by mouth daily. 5 mg  
    
   
   
   
  
 metFORMIN 500 mg tablet Commonly known as:  GLUCOPHAGE Your last dose was: Your next dose is: Take 1 Tab by mouth two (2) times daily (with meals). 500 mg Where to Get Your Medications Information on where to get these meds will be given to you by the nurse or doctor. ! Ask your nurse or doctor about these medications  
  insulin glargine 100 unit/mL (3 mL) Inpn  
 lisinopril 5 mg tablet  
 metFORMIN 500 mg tablet

## 2017-11-07 NOTE — ED TRIAGE NOTES
Pt states lost 20lb in the last 9 days, pt states increase thirst and urination, pt has family hx of DM but he has not been dx, pt states has not seen PCP in 2 1/2 yrs. Sepsis Screening completed    (  )Patient meets SIRS criteria. ( x )Patient does not meet SIRS criteria.       SIRS Criteria is achieved when two or more of the following are present   Temperature < 96.8°F (36°C) or > 100.9°F (38.3°C)   Heart Rate > 90 beats per minute   Respiratory Rate > 20 breaths per minute   WBC count > 12,000 or <4,000 or > 10% bands

## 2017-11-07 NOTE — PROGRESS NOTES
Admitted from ER via stretcher. Initial assessment completed. AAOX4. No c/o of any pain. On RA w/out sob. Monitor shows nsr w/out ectopy. Afebrile. On Insulin drip w/ glucostabilizer. See mar for dosage. 1400- Voided in good amount. Insulin drip @ 3.3 units /hr.    1600- Assessment no changed. VS stable. No c/o of any pain. Afebrile. Insulin drip per glucostabilizer. Dr Heike Lopez visited w/ new orders. 1800- Dr Kita Chan re-visited w/ new orders, Lantus 30 units sq given & iv changed to 1/2 nss @ 125/hr. Eating dinner. Rest of condition no changed.

## 2017-11-08 VITALS
OXYGEN SATURATION: 98 % | SYSTOLIC BLOOD PRESSURE: 149 MMHG | BODY MASS INDEX: 35.79 KG/M2 | TEMPERATURE: 98.2 F | HEIGHT: 70 IN | WEIGHT: 250 LBS | DIASTOLIC BLOOD PRESSURE: 83 MMHG | HEART RATE: 83 BPM | RESPIRATION RATE: 18 BRPM

## 2017-11-08 PROBLEM — E11.9 DM (DIABETES MELLITUS) (HCC): Status: ACTIVE | Noted: 2017-11-07

## 2017-11-08 PROBLEM — E11.00 TYPE 2 DIABETES MELLITUS WITH HYPEROSMOLARITY WITHOUT NONKETOTIC HYPERGLYCEMIC-HYPEROSMOLAR COMA (NKHHC) (HCC): Status: RESOLVED | Noted: 2017-11-07 | Resolved: 2017-11-08

## 2017-11-08 PROBLEM — N17.9 AKI (ACUTE KIDNEY INJURY) (HCC): Status: RESOLVED | Noted: 2017-11-07 | Resolved: 2017-11-08

## 2017-11-08 PROBLEM — R73.9 HYPERGLYCEMIA: Status: RESOLVED | Noted: 2017-11-07 | Resolved: 2017-11-08

## 2017-11-08 LAB
ANION GAP SERPL CALC-SCNC: 8 MMOL/L (ref 3–18)
BUN SERPL-MCNC: 30 MG/DL (ref 7–18)
BUN/CREAT SERPL: 26 (ref 12–20)
CALCIUM SERPL-MCNC: 8.3 MG/DL (ref 8.5–10.1)
CHLORIDE SERPL-SCNC: 104 MMOL/L (ref 100–108)
CHOLEST SERPL-MCNC: 178 MG/DL
CO2 SERPL-SCNC: 30 MMOL/L (ref 21–32)
CREAT SERPL-MCNC: 1.17 MG/DL (ref 0.6–1.3)
ERYTHROCYTE [DISTWIDTH] IN BLOOD BY AUTOMATED COUNT: 14 % (ref 11.6–14.5)
GLUCOSE BLD STRIP.AUTO-MCNC: 237 MG/DL (ref 70–110)
GLUCOSE BLD STRIP.AUTO-MCNC: 255 MG/DL (ref 70–110)
GLUCOSE BLD STRIP.AUTO-MCNC: 287 MG/DL (ref 70–110)
GLUCOSE SERPL-MCNC: 280 MG/DL (ref 74–99)
HCT VFR BLD AUTO: 39.5 % (ref 36–48)
HDLC SERPL-MCNC: 33 MG/DL (ref 40–60)
HDLC SERPL: 5.4 {RATIO} (ref 0–5)
HGB BLD-MCNC: 13.1 G/DL (ref 13–16)
LDLC SERPL CALC-MCNC: 104.2 MG/DL (ref 0–100)
LIPID PROFILE,FLP: ABNORMAL
MCH RBC QN AUTO: 25.9 PG (ref 24–34)
MCHC RBC AUTO-ENTMCNC: 33.2 G/DL (ref 31–37)
MCV RBC AUTO: 78.1 FL (ref 74–97)
PLATELET # BLD AUTO: 228 K/UL (ref 135–420)
PMV BLD AUTO: 11 FL (ref 9.2–11.8)
POTASSIUM SERPL-SCNC: 4.2 MMOL/L (ref 3.5–5.5)
RBC # BLD AUTO: 5.06 M/UL (ref 4.7–5.5)
SODIUM SERPL-SCNC: 142 MMOL/L (ref 136–145)
TRIGL SERPL-MCNC: 204 MG/DL (ref ?–150)
VLDLC SERPL CALC-MCNC: 40.8 MG/DL
WBC # BLD AUTO: 11.7 K/UL (ref 4.6–13.2)

## 2017-11-08 PROCEDURE — 99218 HC RM OBSERVATION: CPT

## 2017-11-08 PROCEDURE — 80048 BASIC METABOLIC PNL TOTAL CA: CPT | Performed by: HOSPITALIST

## 2017-11-08 PROCEDURE — 96372 THER/PROPH/DIAG INJ SC/IM: CPT

## 2017-11-08 PROCEDURE — 74011000258 HC RX REV CODE- 258: Performed by: HOSPITALIST

## 2017-11-08 PROCEDURE — 74011250636 HC RX REV CODE- 250/636: Performed by: HOSPITALIST

## 2017-11-08 PROCEDURE — 82962 GLUCOSE BLOOD TEST: CPT

## 2017-11-08 PROCEDURE — 74011250637 HC RX REV CODE- 250/637: Performed by: HOSPITALIST

## 2017-11-08 PROCEDURE — 85027 COMPLETE CBC AUTOMATED: CPT | Performed by: HOSPITALIST

## 2017-11-08 PROCEDURE — 36415 COLL VENOUS BLD VENIPUNCTURE: CPT | Performed by: HOSPITALIST

## 2017-11-08 PROCEDURE — 74011636637 HC RX REV CODE- 636/637: Performed by: HOSPITALIST

## 2017-11-08 PROCEDURE — 80061 LIPID PANEL: CPT | Performed by: HOSPITALIST

## 2017-11-08 RX ORDER — METFORMIN HYDROCHLORIDE 500 MG/1
500 TABLET ORAL 2 TIMES DAILY WITH MEALS
Qty: 60 TAB | Refills: 0 | Status: SHIPPED | OUTPATIENT
Start: 2017-11-08

## 2017-11-08 RX ORDER — INSULIN GLARGINE 100 [IU]/ML
30 INJECTION, SOLUTION SUBCUTANEOUS
Status: DISCONTINUED | OUTPATIENT
Start: 2017-11-08 | End: 2017-11-08 | Stop reason: HOSPADM

## 2017-11-08 RX ORDER — INSULIN GLARGINE 100 [IU]/ML
35 INJECTION, SOLUTION SUBCUTANEOUS
Qty: 30 ML | Refills: 2 | Status: SHIPPED | OUTPATIENT
Start: 2017-11-08

## 2017-11-08 RX ORDER — LISINOPRIL 5 MG/1
5 TABLET ORAL DAILY
Qty: 30 TAB | Refills: 2 | Status: SHIPPED | OUTPATIENT
Start: 2017-11-08

## 2017-11-08 RX ADMIN — INSULIN LISPRO 5 UNITS: 100 INJECTION, SOLUTION INTRAVENOUS; SUBCUTANEOUS at 08:55

## 2017-11-08 RX ADMIN — INSULIN GLARGINE 30 UNITS: 100 INJECTION, SOLUTION SUBCUTANEOUS at 16:24

## 2017-11-08 RX ADMIN — PANTOPRAZOLE SODIUM 40 MG: 40 TABLET, DELAYED RELEASE ORAL at 08:55

## 2017-11-08 RX ADMIN — INSULIN LISPRO 4 UNITS: 100 INJECTION, SOLUTION INTRAVENOUS; SUBCUTANEOUS at 08:55

## 2017-11-08 RX ADMIN — SODIUM CHLORIDE 125 ML/HR: 450 INJECTION, SOLUTION INTRAVENOUS at 02:54

## 2017-11-08 RX ADMIN — ENOXAPARIN SODIUM 40 MG: 40 INJECTION SUBCUTANEOUS at 16:24

## 2017-11-08 RX ADMIN — INSULIN LISPRO 6 UNITS: 100 INJECTION, SOLUTION INTRAVENOUS; SUBCUTANEOUS at 12:26

## 2017-11-08 RX ADMIN — INSULIN LISPRO 5 UNITS: 100 INJECTION, SOLUTION INTRAVENOUS; SUBCUTANEOUS at 12:26

## 2017-11-08 RX ADMIN — SODIUM CHLORIDE 125 ML/HR: 450 INJECTION, SOLUTION INTRAVENOUS at 11:22

## 2017-11-08 NOTE — PROGRESS NOTES
4949 Patient in bed this time, AM medications tolerated well. A/O x 4. Lungs clear, abdomen soft and non-distended. Bowel sounds active, 18G IV to right AC, infusing without difficulty. No signs of phlebitis or infiltration noted. Skin warm ,dry and intact. Patient denies pain or discomfort. Bed placed in lowest position, call bell within reach. 1630- This writer has reviewed discharge instructions with patient at this time. Patient has verbalized understanding. Patient was provided with care notes to include side effects of RX's. IV removed, patient tolerated well. Arm bands removed and shredded. AVS were reviewed with Prateek Moyer RN.

## 2017-11-08 NOTE — ROUTINE PROCESS
TRANSFER - IN REPORT:    Verbal report received from Tray Wihte RN(name) on Sally Matos  being received from ICU(unit) for routine progression of care      Report consisted of patients Situation, Background, Assessment and   Recommendations(SBAR). Information from the following report(s) SBAR, Kardex, MAR, Recent Results, Cardiac Rhythm NSR and Alarm Parameters  was reviewed with the receiving nurse. Opportunity for questions and clarification was provided. Assessment completed upon patients arrival to unit and care assumed.

## 2017-11-08 NOTE — PROGRESS NOTES
Shift Summary- Pt transferred from ICU. No changes to report overnight.       Patient Vitals for the past 12 hrs:   Temp Pulse Resp BP SpO2   11/08/17 0408 97.6 °F (36.4 °C) 69 15 139/77 99 %   11/08/17 0200 - 72 15 116/50 100 %   11/08/17 0100 - 70 12 148/81 100 %   11/08/17 0023 97.7 °F (36.5 °C) - - - -   11/08/17 0000 - 72 13 143/73 99 %   11/07/17 2300 - 70 14 145/80 99 %   11/07/17 2200 - 80 16 - -   11/07/17 2100 - 77 17 145/90 100 %   11/07/17 2000 97.9 °F (36.6 °C) 67 15 150/89 100 %   11/07/17 1800 - 89 12 (!) 168/94 98 %

## 2017-11-08 NOTE — PROGRESS NOTES
1840-Report received from Denzel Wilkes RN. myron Chawla, labs, kardex and patient status reviewed. Per report Glucostabalizer/insulin drip to be turned off at 2000 and patient to be transitioned in [facility] insulin. Insulin drip rate at 6 at this time-verified with Nguyen Fleming RN.     2000-Assessment complete. Insulin drip off.    2200-, gave 2Units Humalog insulin per coverage protocol. Patient has been NPO most of the day and ate 100% of diabetic lunchbox tray at this time. 0000-VSS no change in assessment. 0230-Report given to Jeanette Hung RN. [place], mar, labs, kardex and patient status reviewed. Patient transferred via wheelchair with Patsy Dumas CNA to Medical room 328.

## 2017-11-08 NOTE — DIABETES MGMT
Diabetes Patient/Family Education Record    Factors That  May Influence Patients Ability  to Learn or  Comply with Recommendations   []   Language barrier    []   Cultural needs   []   Motivation    []   Cognitive limitation    []   Physical   []   Education    []   Physiological factors   []   Hearing/vision/speaking impairment   []   Presybeterian beliefs    []   Financial factors   []  Other:   [x]  No factors identified at this time.      Person Instructed:   []   Patient   []   Family   []  Other     Preference for Learning:   [x]   Verbal   [x]   Written   []  Demonstration     Level of Comprehension & Competence:    []  Good                                      [x] Fair                                     []  Poor                             [x]  Needs Reinforcement   [x]  Teachback completed    Education Component:  What is Diabetes Booklet provided to pt; overview of DM disease process, the role of the pancreas & liver in glucose metabolism; s/s of hyperglycemia; encouraged to attend OP Diabetes Self Management Class   [x]  Medication management, including the need for basal insulin + oral medication as part of home DM regimen   [x]  Nutritional management; pt drinks gatpeterde, Dr. Zelalem Blum, & orange juice; pt educated on the impact of sugary beverages on glycemic control; pt verbalized understanding a stated he will consume diet options only   []  Exercise   []  Signs, symptoms, and treatment of hyperglycemia and hypoglycemia   [] Prevention, recognition and treatment of hyperglycemia and hypoglycemia   []  Importance of blood glucose monitoring and how to obtain a blood glucose meter    []  Instruction on use of the blood glucose meter   [x]  Discuss the importance of HbA1C monitoring; pt provided with results; educated on A1C criteria for DM     []  Sick day guidelines   []  Proper use and disposal of lancets, needles, syringes or insulin pens (if appropriate)   [x]  Potential long-term complications (retinopathy, kidney disease, neuropathy, foot care)   [] Information about whom to contact in case of emergency or for more information    [x]  Goal:  Patient/family will demonstrate understanding of Diabetes Self Management Skills by: within 2 weeks of discharge  Plan for post-discharge education or self-management support:    [x] Outpatient class schedule provided            [] Patient Declined    [] Scheduled for outpatient classes (date) _______       Sebastien Anaya RN, MS  Glycemic Control Team

## 2017-11-08 NOTE — PROGRESS NOTES
Pt admitted for fatigue, polyuria and polydipsia. Pt with a history of diabetes and hypertension. Pt is independent. Please encourage ambulation. Pt's PCP has retired and CMS has assisted with making an appointment (11/28/17) with another physican in the practice, at pt's request.  No other plan of care needs identified. Anticipate pt will be medically stable for discharge later today. CM available to assist as needed. Discharge Reassessment Plan:  Low Risk    RRAT Score:  1 - 12     Low Risk Care Transition Interventions:  1. Discharge transition plan:  Physician follow up   2. Involved patient/caregiver in assessment, planning, education and implement of intervention. 3. CM daily patient care huddles/interdisciplinary rounds were completed. 4. PCP/Specialist appointment within 5 days made prior to discharge. Date/Time  5. Facilitated transportation and logistics for follow-up appointments. 6. Handoff to [address] [place] [address] Nurse Navigator or PCP practice. Care Management Interventions  PCP Verified by CM: Yes  Mode of Transport at Discharge:  Other (see comment) (spouse/family)  Transition of Care Consult (CM Consult): Discharge Planning  Health Maintenance Reviewed: Yes  Current Support Network: Lives with Spouse  Confirm Follow Up Transport: Self  Plan discussed with Pt/Family/Caregiver: Yes  Discharge Location  Discharge Placement: Home with family assistance

## 2017-11-08 NOTE — DISCHARGE SUMMARY
Discharge Summary    Patient: Cielo Marx MRN: [id number]  CSN: [id number]    YOB: 1965  Age: 49 y.o. Sex: male    DOA: 11/7/2017 LOS:  LOS: 1 day   Discharge Date:      Primary Care Provider:  Wilber Sanchez MD    Admission Diagnoses: Hyperglycemia  Dizziness    Discharge Diagnoses:    Problem List as of 11/8/2017  Never Reviewed          Codes Class Noted - Resolved    Dizziness ICD-10-CM: R42  ICD-9-CM: 780.4  11/7/2017 - Present        * (Principal)DM (diabetes mellitus) ([facility].) ICD-10-CM: E11.9  ICD-9-CM: 250.00  11/7/2017 - Present        RESOLVED: Hyperglycemia ICD-10-CM: R73.9  ICD-9-CM: 790.29  11/7/2017 - 11/8/2017        RESOLVED: DONELL (acute kidney injury) ([facility].) ICD-10-CM: N17.9  ICD-9-CM: 584.9  11/7/2017 - 11/8/2017              Discharge Medications:     Current Discharge Medication List      START taking these medications    Details   insulin glargine (BASAGLAR KWIKPEN) 100 unit/mL (3 mL) inpn 35 Units by SubCUTAneous route nightly. Qty: 30 mL, Refills: 2      metFORMIN (GLUCOPHAGE) 500 mg tablet Take 1 Tab by mouth two (2) times daily (with meals). Qty: 60 Tab, Refills: 0      lisinopril (PRINIVIL, ZESTRIL) 5 mg tablet Take 1 Tab by mouth daily. Qty: 30 Tab, Refills: 2             Discharge Condition: Good        Consults: Diabetes nurse educator      PHYSICAL EXAM   Visit Vitals    /79 (BP 1 Location: Left arm, BP Patient Position: Sitting)    Pulse 83    Temp 97.8 °F (36.6 °C)    Resp 18    Ht 5' 10\" (1.778 m)    Wt 113.4 kg (250 lb)    SpO2 100%    BMI 35.87 kg/m2     General: Awake, cooperative, no acute distress    HEENT: NC, Atraumatic. PERRLA, EOMI. Anicteric sclerae. Lungs:  CTA Bilaterally. No Wheezing/Rhonchi/Rales. Heart:  Regular  rhythm,  No murmur, No Rubs, No Gallops  Abdomen: Soft, Non distended, Non tender. +Bowel sounds,   Extremities: No c/c/e  Psych:   Not anxious or agitated. Neurologic:  No acute neurological deficits. Admission HPI : Jim Wagner is a 49 y.o. male who has past history of HTN presents to Er with concerns of fatigue, polyuria and polydipsia. This morning he also felt lightheaded. He has no history of DM. Denies any chest pain, SOB. He reports he drinks gatorade, orange juice and iced tea almost every day. Denies smoking, occasional alcohol use. In ER his blood sugars 798, creatine at 1.67. He is being admitted for further management.       Hospital Course :   Hyperglycemic Hyperosmolar state - Patient admitted to ICU, he was started on insulin drip and generous IVF, once his blood sugars stabilized and he met parameters, he was transitioned to basal and SSI. At discharge will discharge him on basaglar and metformin. Goal metformin dose 1000mg bid. Will start on lisinopril for renal protection. Recommended follow up with PCP. Also recommend follow up BMP since patient is started on metformin and lisinopril.      Activity: Activity as tolerated    Diet: Diabetic Diet    Follow-up: PCP    Disposition: home    Minutes spent on discharge: 45       Labs: Results:       Chemistry Recent Labs      11/08/17   0533  11/07/17   1715  11/07/17   1305  11/07/17   0935   GLU  280*  184*  370*  798*   NA  142  150*  146*  135*   K  4.2  4.1  3.8  4.6   CL  104  109*  107  96*   CO2  30  31  33*  30   BUN  30*  30*  30*  32*   CREA  1.17  1.32*  1.50*  1.67*   CA  8.3*  8.9  8.8  9.1   AGAP  8  10  6  9   BUCR  26*  23*  20  19   AP   --   84  99  123*   TP   --   6.6  6.8  7.0   ALB   --   3.0*  3.2*  3.3*   GLOB   --   3.6  3.6  3.7   AGRAT   --   0.8  0.9  0.9      CBC w/Diff Recent Labs      11/08/17   0533  11/07/17   0935   WBC  11.7  13.1   RBC  5.06  5.59*   HGB  13.1  14.6   HCT  39.5  43.5   PLT  228  251   GRANS   --   84*   LYMPH   --   10*   EOS   --   0      Cardiac Enzymes Recent Labs      11/07/17   0935   CPK  295   CKND1  1.4      Coagulation No results for input(s): PTP, INR, APTT in the last 72 hours.    No lab exists for component: INREXT, INREXT    Lipid Panel Lab Results   Component Value Date/Time    Cholesterol, total 178 11/08/2017 05:33 AM    HDL Cholesterol 33 11/08/2017 05:33 AM    LDL, calculated 104.2 11/08/2017 05:33 AM    VLDL, calculated 40.8 11/08/2017 05:33 AM    Triglyceride 204 11/08/2017 05:33 AM    CHOL/HDL Ratio 5.4 11/08/2017 05:33 AM      BNP No results for input(s): BNPP in the last 72 hours. Liver Enzymes Recent Labs      11/07/17   1715   TP  6.6   ALB  3.0*   AP  84   SGOT  19      Thyroid Studies No results found for: T4, T3U, TSH, TSHEXT, TSHEXT         Significant Diagnostic Studies: Xr Chest Port    Result Date: 11/7/2017  Chest, single view Indication: Chest pain, dizziness Comparison: None Findings:  Portable upright AP view of the chest was obtained. Patient is mildly rotated on the provided projection, with associated foreshortening of the right hemithorax. There is no focal pneumonic opacity, pneumothorax, or pleural effusion. Cardiac size, mediastinal contours, and pulmonary vascularity are normal. There is no acute osseous abnormality. Impression: Mildly rotated projection of the chest, without superimposed acute radiographic abnormality. No results found for this or any previous visit.         CC: Vimal Kim MD

## 2017-11-08 NOTE — DISCHARGE INSTRUCTIONS
Lab Results   Component Value Date/Time    Hemoglobin A1c 11.3 11/07/2017 09:35 AM       This lab test reflects that your blood sugar has been slightly elevated over the past 3 months and should be evaluated by your primary care provider. An A1C of 5.7-6.4% meets the criteria for pre-diabetes; an A1C of 6.5% or higher meets the criteria for diabetes. This lab test reflects that your blood sugar averaged 289 mg/dL  over the past 3 months. It is important to follow up with your provider on a routine basis to continue to evaluate your blood sugar and discuss any necessary changes in treatment.          Patient armband removed and shredded

## 2017-11-08 NOTE — DIABETES MGMT
GLYCEMIC CONTROL PROGRESS NOTE:    - new dx, Type 2   - continuation of in depth education today   - general overview of all DM, disease process, insulin basics, Lantus, nutrition, SMBG, hyper + hypo   - glucometer & SMBG, insulin admin using demo insulin pen & pen needle  - pt provided with AquaMatrix Glucometer; pt able to do demonstrate SMBG including: aseptic technique, use of lancing device & lancet, placement of glucose strip in monitor & obtaining blood sample  - insulin pens  * pt able to demonstrate 2x the successful priming, dialing of correct dose (35 units), injection & disposal of needle using an sample insulin pen  - pt was able to identify appropriate treatment options for hypoglycemic events (glucose tablets, soda)   - discussed safety risks with basal insulin and potential for hypos, discussed profile of basal insulin and 24 hour duration of action, encouraged consistent eating,& take insulin before breakfast at the same time daily  -pt able to verbalize safe storage of insulin, disposal of pen needles & lancing devices  -pt provided with Coreen Starting Insulin Kit  -encouraged pt to attend OP Diabetes Self Management Class  -pt verbalized understanding of above items with no further questions at this time  * pt also provided with   - 1 voucher for free 1st fill of Basaglar insulin (5 pen count)  - 3 packets of  BD Ultra-fine Pen needles (15 count)    Eleanor Roberson RN, MS  Glycemic Control Team

## 2017-11-08 NOTE — DIABETES MGMT
NUTRITION ASSESSMENT / GLYCEMIC CONTROL PLAN OF CARE     Marguerite Ibanez           49 y.o.              Patient Active Problem List   Diagnosis Code    Dizziness R42    DM (diabetes mellitus) ([facility] E11.9        INTERVENTIONS/PLAN:   - new-onset DM, A1C 11.3%, admitted in [facility], placed on insulin drip, transitioned to sub-q basal/bolus overnight  - BG continue high today, will need to follow-up with OP PCM re: dose adjustments, pt aware, seems very motivated for change   - DM Education provided per [facility] RD and RN (see additional note), including insulin ed, glucometer & SMBG   * Agamatrix glucometer + 60 strips, 43 lancets and 1 lancing device given to pt   * saving card for [employer], to reduce copay also given to pt   * 15 count of insulin pen needles given to pt as well, will need to get rx from OP PCM  - encouraged OP DM Self Management Class (schedule given)  - discussed with Dr. Omid Monteiro on floor  - recommend d/c basal/oral regimen & follow-up with OP PCM asap for monitoring & dose adjustments    ASSESSMENT:   Nutritional Status: obesity, BG out of target range r/t new-onset DM, disease & nutrition knowledge deficit         Lab Results   Component Value Date/Time     (H) 11/08/2017 05:33 AM     (H) 11/07/2017 05:15 PM    GLUCPOC 255 (H) 11/08/2017 11:21 AM    GLUCPOC 237 (H) 11/08/2017 07:54 AM    GLUCPOC 177 (H) 11/07/2017 09:55 PM               Within target range (non-ICU: <140; ICU<180): [] Yes   [x]  No    Current Insulin regimen:   - Lantus 30 units every day  - Humalog 5 units qac  - Humalog Very Insulin Resistant Corrective Coverage    Home medication/insulin regimen:   - n/a, new dx    HbA1c: equivalent  to ave BGlucose of 278 mg/dl for 2-3 months prior to admission    Lab Results   Component Value Date/Time    Hemoglobin A1c 11.3 11/07/2017 09:35 AM       Adequate glycemic control PTA:  [] Yes  [x] No       SUBJECTIVE/OBJECTIVE:   Information obtained from: pt, IDT, chart; pt confirmed no previous dx of DM, reports + family h/o, reports has been craving sweet drinks, OJ, gatorade, sweet tea, soda, described polydipsia, polyuria, blurry vision, extreme fatigue, extensive education given, see additional GC RN ed notes         Medications: [x]                Reviewed        Labs:   Lab Results   Component Value Date/Time    Sodium 142 11/08/2017 05:33 AM    Potassium 4.2 11/08/2017 05:33 AM    Chloride 104 11/08/2017 05:33 AM    CO2 30 11/08/2017 05:33 AM    Anion gap 8 11/08/2017 05:33 AM    Glucose 280 11/08/2017 05:33 AM    BUN 30 11/08/2017 05:33 AM    Creatinine 1.17 11/08/2017 05:33 AM    Calcium 8.3 11/08/2017 05:33 AM    Magnesium 2.5 11/07/2017 09:35 AM    Albumin 3.0 11/07/2017 05:15 PM       Anthropometrics: IBW : 83 kg (183 lb), % IBW (Calculated): 136.61 %, BMI (calculated): 35.9  Wt Readings from Last 1 Encounters:   11/08/17 113.4 kg (250 lb)    Ht Readings from Last 1 Encounters:   11/08/17 5' 10\" (1.778 m)       Estimated Nutrition Needs: 2075 Kcals/day (25 kcal/kg of IBW), Protein (g): 100 g (1.2 g/kg) Fluid (ml): 2075 ml (25 kcal/kg)  Based on:   []          Actual BW    [x]          IBW   []            Adjusted BW        Diet:   Active Orders   Diet    DIET DIABETIC CONSISTENT CARB Regular       Intake:   Patient Vitals for the past 100 hrs:   % Diet Eaten   11/07/17 2216 100 %       Nutrition Diagnoses:   - altered nutrition-related lab values r/t Dm AEB A1C of 11.3% and known h/o DM2  - obesity r/t h/o excessive energy intake AEB BMI of 36%  - disease and nutrition knowledge deficit r/t DM AEB dx of new-onset DM and no previous education     Nutrition Interventions:   - education, diet Consistent CHO     Goal: .  - BG will be in target range of  mg/dl (non-ICU) by 11/13   - pt will check BG at least 1x/day at home by 11/15  - pt will maintain current wt/prevent wt gain by 12/8  - pt will follow up with OP PCP for DM by 11/15       Nutrition Monitoring and Evaluation      [x] Monitor po intake on meal rounds  [x]     Continue inpatient monitoring and intervention  [x]     Other: BG      Nutrition Risk:  []   High     []  Moderate    [x]  Minimal/Uncompromised    ANTONIO Lambert, MPH, RD, CDE

## 2017-11-08 NOTE — ROUTINE PROCESS
Bedside shift change report given to DIEGO Castrejon RN (oncoming nurse) by Marquis Cantu RN (offgoing nurse). Report included the following information SBAR, Kardex, MAR, Recent Results and Alarm Parameters .